# Patient Record
Sex: FEMALE | Race: OTHER
[De-identification: names, ages, dates, MRNs, and addresses within clinical notes are randomized per-mention and may not be internally consistent; named-entity substitution may affect disease eponyms.]

---

## 2019-08-08 ENCOUNTER — HOSPITAL ENCOUNTER (INPATIENT)
Dept: HOSPITAL 74 - JLDR | Age: 39
LOS: 4 days | Discharge: HOME | End: 2019-08-12
Attending: OBSTETRICS & GYNECOLOGY | Admitting: OBSTETRICS & GYNECOLOGY
Payer: COMMERCIAL

## 2019-08-08 VITALS — BODY MASS INDEX: 25.5 KG/M2

## 2019-08-08 DIAGNOSIS — O99.013: ICD-10-CM

## 2019-08-08 DIAGNOSIS — Z3A.38: ICD-10-CM

## 2019-08-08 DIAGNOSIS — D62: ICD-10-CM

## 2019-08-08 LAB
ANION GAP SERPL CALC-SCNC: 12 MMOL/L (ref 8–16)
ANISOCYTOSIS BLD QL: 0
APTT BLD: 31.7 SECONDS (ref 25.2–36.5)
BASOPHILS # BLD: 0.2 % (ref 0–2)
BASOPHILS # BLD: 0.4 % (ref 0–2)
BUN SERPL-MCNC: 11.2 MG/DL (ref 7–18)
CALCIUM SERPL-MCNC: 8.6 MG/DL (ref 8.5–10.1)
CHLORIDE SERPL-SCNC: 110 MMOL/L (ref 98–107)
CO2 SERPL-SCNC: 20 MMOL/L (ref 21–32)
CREAT SERPL-MCNC: 0.7 MG/DL (ref 0.55–1.3)
DEPRECATED RDW RBC AUTO: 12.3 % (ref 11.6–15.6)
DEPRECATED RDW RBC AUTO: 12.4 % (ref 11.6–15.6)
EOSINOPHIL # BLD: 0.1 % (ref 0–4.5)
EOSINOPHIL # BLD: 0.3 % (ref 0–4.5)
GLUCOSE SERPL-MCNC: 68 MG/DL (ref 74–106)
HCT VFR BLD CALC: 31.1 % (ref 32.4–45.2)
HCT VFR BLD CALC: 36.3 % (ref 32.4–45.2)
HGB BLD-MCNC: 10.5 GM/DL (ref 10.7–15.3)
HGB BLD-MCNC: 12.6 GM/DL (ref 10.7–15.3)
INR BLD: 0.91 (ref 0.83–1.09)
LYMPHOCYTES # BLD: 23.4 % (ref 8–40)
LYMPHOCYTES # BLD: 6.7 % (ref 8–40)
MACROCYTES BLD QL: 0
MCH RBC QN AUTO: 33 PG (ref 25.7–33.7)
MCH RBC QN AUTO: 33.8 PG (ref 25.7–33.7)
MCHC RBC AUTO-ENTMCNC: 33.8 G/DL (ref 32–36)
MCHC RBC AUTO-ENTMCNC: 34.9 G/DL (ref 32–36)
MCV RBC: 97 FL (ref 80–96)
MCV RBC: 97.6 FL (ref 80–96)
MONOCYTES # BLD AUTO: 1.6 % (ref 3.8–10.2)
MONOCYTES # BLD AUTO: 4.9 % (ref 3.8–10.2)
NEUTROPHILS # BLD: 71 % (ref 42.8–82.8)
NEUTROPHILS # BLD: 91.4 % (ref 42.8–82.8)
PLATELET # BLD AUTO: 173 K/MM3 (ref 134–434)
PLATELET # BLD AUTO: 233 K/MM3 (ref 134–434)
PLATELET BLD QL SMEAR: NORMAL
PMV BLD: 7.6 FL (ref 7.5–11.1)
PMV BLD: 7.8 FL (ref 7.5–11.1)
POTASSIUM SERPLBLD-SCNC: 4 MMOL/L (ref 3.5–5.1)
PT PNL PPP: 10.7 SEC (ref 9.7–13)
RBC # BLD AUTO: 3.18 M/MM3 (ref 3.6–5.2)
RBC # BLD AUTO: 3.74 M/MM3 (ref 3.6–5.2)
SODIUM SERPL-SCNC: 141 MMOL/L (ref 136–145)
WBC # BLD AUTO: 20 K/MM3 (ref 4–10)
WBC # BLD AUTO: 9.5 K/MM3 (ref 4–10)

## 2019-08-08 RX ADMIN — IBUPROFEN PRN MG: 800 INJECTION INTRAVENOUS at 21:34

## 2019-08-08 NOTE — OP
DATE OF OPERATION:  2019

 

PREOPERATIVE DIAGNOSIS:  Pregnancy with estimated gestational age of 38 weeks 
and 6

days.  Fetus in breech presentation.  Spontaneous labor.  

 

POSTOPERATIVE DIAGNOSIS:  Pregnancy with estimated gestational age of 38 weeks 
and 6

days.  Fetus in breech presentation.  Spontaneous labor.  Delivered.  

 

PROCEDURE:  Primary low transverse  section via Pfannenstiel skin 
incision.  

 

SURGEON:  Dusty Pendleton M.D. 

 

ANESTHESIOLOGIST:  CHIQUI Link  

 

ANESTHESIOLOGIST:  Ruben Burgos M.D. 

 

ANESTHESIA:  Spinal.

 

COMPLICATIONS:  None.

 

ESTIMATED BLOOD LOSS:  1000 mL 

 

INTRAVENOUS FLUIDS:  200 mL 

 

URINE OUTPUT:  300 mL of clear urine at the end of the procedure

 

PATHOLOGY:  Placenta. 

 

FINDINGS:  Live baby boy in saroj breech presentation, no meconium in amniotic

fluids, Apgars 9 and 9, baby's weight is 2977 g, normal uterus, fallopian tubes 
and

ovaries noted.  

 

PROCEDURE:  The patient was met preoperatively.  Risks, benefits, and 
alternatives of

surgery were discussed in detail.  All questions were answered.  The consent 
form was

reviewed and signed.  Patient verbalized her understanding. The patient then

requested to proceed with surgery.  The patient was then brought to the OR with 
the

IV running.  She was placed on the surgical table in a sitting position.  The 
spinal

anesthesia was achieved without difficulty.  The patient was then placed in the

supine position with leftward tilt.  A Petty catheter was inserted and left to 
drain

to gravity.  The patient was prepped and draped in the usual sterile fashion.  A

timeout was conducted as per standard protocol.  The surgeon then proceeded 
with the

operation.  A Pfannenstiel skin incision was made with the knife approximately 
2 cm

above the pubic symphysis.  The incision was taken down to the level of fascia.
  The

fascia was incised in the midline.  The incision was extended bilaterally using 
Smith

scissors.  The fascia was dissected away from the rectus muscles superiorly and

inferiorly.  Sharp and blunt dissection was used to dissect the fascia.  The 
rectus

muscles were  in the midline using blunt dissection.  The peritoneum 
was

identified and entered with 2 Huyen clamps and entered sharply.  The peritoneal

incision was then extended superiorly and inferiorly.  The bladder peritoneum 
was

carefully dissected away from the uterine lower segment using sharp dissection.
  The

bladder was reflected downwards.  The uterus was incised transversely in the 
lower

uterine segment.  The incision was then extended bilaterally using bandage 
scissors. 

The baby was delivered from saroj breech presentation without complications.  
The

baby's nose and mouth were suctioned on perineum.  The baby cried spontaneously 
and

was handed to the waiting neonatologist.  A fragment of the umbilical cord was

secured for umbilical cord blood gas.  The cord blood collection was performed 
for

umbilical cord blood storage as per patient request.  The placenta was then 
delivered

manually and without complications.  The uterus was exteriorized and cleared of 
all

clots and debris.  The uterine incision was then repaired using 0 Biosyn suture 
in a

running, locking stitch.  The uterine incision was then imbricated using a 0 
Biosyn

suture in the running stitch with good hemostasis.  The operative site was 
irrigated

using copious amounts of normal saline.  Once the saline was aspirated, good

hemostasis was noted.  The parietal peritoneum was then closed using a 2-0 
chromic

suture.  The rectus muscles were approximated in the midline using several

interrupted 2-0 chromic sutures.  The fascia was closed using a 0 Vicryl suture 
with

a running stitch.  Good hemostasis and approximation were confirmed.  The

subcutaneous tissues and Bandar's fascia were approximated using several 
interrupted

2-0 chromic sutures.  The skin was closed using a 4-0 Biosyn suture in a 
subcutaneous

stitch.  The patient tolerated procedure well.  She was transferred to recovery 
room

in stable condition.  Sponge, lap, needle counts and instrument counts were 
correct. 

 

 

 

DIDI [id number]

DD: 2019 21:35

DT: 2019 23:34

Job #:  [id number]

MTDD

## 2019-08-08 NOTE — HP
Past Medical History





- Primary Care Physician


PCP:: Dusty Pendleton





- Admission


Chief Complaint: 38yo P0 with pregnancy at EGA 38w6d with fetus in breech 

presentation, in labor, was admitted for primary C/S.


History of Present Illness: 





AMA


Breech


History Source: Patient, Medical Record


Limitations to Obtaining History: No Limitations





- Past Medical History


CNS: No: Alzheimer's, CVA, Dementia, Migraine, Multiple Sclerosis, Peripheral 

Neuropathy, Parkinson's, Seizure, Syncope, TIA, Vertigo, Other


Cardiovascular: No: AFIB, Aneurysm, Aortic Insufficiency, Aortic Stenosis, CAD, 

CHF, Deep Vein Thrombosis, HTN, Hyperlipdemia, MI, Mitral Insufficiency, Mitral 

Stenosis, Murmur, Pulmonary Hypertension, Other


Pulmonary: No: Asthma, Bronchitis, Cancer, COPD, O2 Dependent, Pneumonia, 

Previously Intubated, Pulmonary Embolus, Pulmonary Fibrosis, Sleep Apnea, Other


Gastrointestinal: No: Ascites, Cancer, Constipation, Crohn's Disease, 

Diverticulitis, Diverticulosis, Esophageal Varices, Gastritis, GERD, GI Bleed, 

Hemorrhoids, Hiatal Hernia, Inflamatory Bowel Disease, Irritable Bowel Disease, 

Pancreatitis, Peptic Ulcer Disease, Ulcerative Colitis, Other


Hepatobiliary: No: Cirrhosis, Cholelithiasis, Cholecystitis, Choledocholithiasis

, Hepatitis A, Hepatitis B, Hepatitis C, Other


Renal/: No: Renal Failure, Renal Inusuff, BPH, Cancer, Hematuria, Hemodialysis

, Neurogenic Bladder, Renal Calculi, UTI, Other


Reproductive: No: Ectopic Pregnancy, Endometriosis, Fibroids, PID, Polycystic 

Ovary Syndrome, Postmenopausal, Other


...: 1


...Para: 0


... Weeks Gestation by Dates: 38.6


...EDC by Sono: 19


Heme/Onc: No: Anemia, B12 Deficiency, Bleeding Disorder, Cancer, Current 

Chemotherapy, Current Radiation Therapy, Hemochromatosis, Hypercoaguable State, 

Myeloproliferative Synd, Sickle Cell Disease, Sickle Cell Trait, 

Thrombocytopenia, Other


Infectious Disease: No: AIDS, C-Diff, Herpes Zoster, HIV, MRSA, STD's, 

Tuberculosis, VREF, Other


Psych: No: Addictions, Anxiety, Bipolar, Depression, Panic, Psychosis, 

Schizophrenia, Other


Musculoskeletal: No: Bursitis, Chronic low back pain, Hemiparesis, Hemiplegia, 

Osteoarthritis, Paraplegia, Other


Rheumatology: No: Fibromyalgia, Gout, Lupus, Rheumatoid Arthritis, Sarcoidosis, 

Vasculitis, Other


ENT: No: Allergic Rhinitis, Sinusitis, Other


Endocrine: No: Pako's Disease, Cushing's Disease, Diabetes Insipidus, 

Diabetes Mellitus, Hyperparathyroidism, Hyperthyroidism, Hypothyroidism, 

Osteopenia, SIADH, Other


Dermatology: No: Basal Cell, Cellulitis, Eczema, Melanoma, Psoriasis, Squamous 

Cell, Other





- Past Surgical History


Past Surgical History: Yes: None


Hx Myomectomy: No


Hx Transabdominal Cerclage: No


Additional Surgical History: LEEP





- Smoking History


Smoking history: Never smoked


Have you smoked in the past 12 months: No





- Alcohol/Substance Use


Hx Alcohol Use: No


History of Substance Use: reports: None





- Social History


Usual Living Arrangement: Yes: With Spouse


ADL: Independent


History of Recent Travel: No





Home Medications





- Allergies


Allergies/Adverse Reactions: 


 Allergies











Allergy/AdvReac Type Severity Reaction Status Date / Time


 


Sulfa (Sulfonamide Allergy  Hives Verified 19 15:33





Antibiotics)     














- Home Medications


Home Medications: 


Ambulatory Orders





Prenatal Vits96/Iron Fum/Folic [Prenatal Tablet] 1 each PO DAILY 19 











Family Disease History





- Family Disease History


Family Disease History: Diabetes: Father, Other: Mother (HTN)





Review of Systems





- Review of Systems


Constitutional: reports: Other (contractions, labor)


Eyes: reports: No Symptoms


HENT: reports: No Symptoms


Neck: reports: No Symptoms


Cardiovascular: reports: No Symptoms


Respiratory: reports: No Symptoms


Gastrointestinal: reports: No Symptoms


Genitourinary: reports: No Symptoms


Breasts: reports: No Symptoms Reported


Musculoskeletal: reports: No Symptoms


Integumentary: reports: No Symptoms


Neurological: reports: No Symptoms


Endocrine: reports: No Symptoms


Hematology/Lymphatic: reports: No Symptoms


Psychiatric: reports: No Symptoms


Pain Intensity: 6





Physical Exam - Maternity


Vital Signs: 


 Vital Signs











Temperature  97.8 F   19 21:02


 


Pulse Rate  66   19 21:02


 


Respiratory Rate  20   19 21:02


 


Blood Pressure  110/67   19 21:02


 


O2 Sat by Pulse Oximetry (%)  99   19 20:15











Constitutional: Yes: Well Nourished, No Distress, Calm


Eyes: Yes: WNL, Conjunctiva Clear


HENT: Yes: WNL, Atraumatic, Normocephalic


Neck: Yes: WNL, Supple, Trachea Midline


Cardiovascular: Yes: WNL, Regular Rate and Rhythm


Lungs: Clear to auscultation, Normal air movement





- Abdominal Exam/OB


Fundal Height: 39


Number of Fetuses: Single


Fetal Presentation: Breech


Contractions: Yes


Intensity: Moderate


Fetal Monitor Mode: External


Fetal Heart Rate (range): 135


Fetal Heart Rate Location: Midline


Category: I


Accelerations: Uniform


Decelerations: None





- Vaginal Exam/OB


Vaginal Bleediing: No


Speculum Exam: No


Dilatation (cm): 2


Effacement (%): 90


Amniotic Membrane Status: Intact


Fetal Presentation: Remigio Breech


Fetal Station: -2





- Physical Exam


Musculoskeletal: Yes: WNL


Extremities: Yes: WNL


Edema: No


Edema: LLE: Trace, RLE: Trace


Integumentary: Yes: WNL


Deep Tendon Reflex Grade: Normal +2


...Motor Strength: WNL


Psychiatric: Yes: WNL, Alert, Oriented





- Labs


Lab Results: 


 CBC, BMP





 19 15:45 











Hemorrhage Risk Assessment





- Risk Factors


Medium Risk Factors: Yes: None


High Risk Factors: Yes: None


Risk Score: 1


Risk Level: Medium Risk





Imaging





- Results


Ultrasound: Report Reviewed, Other (breech confirmed by bedside US)





Assessment/Plan





38yo P0 with pregnancy at EGA 38w6d with fetus in breech presentation, in labor

, was admitted for primary C/S. We discussed the risks and benefits of C/S at 

length, including but not limited to scarring, pain, bleeding, infection, 

injury to underlying organs and structures, need for additional surgery to 

repair/treat any problems or complications, fetal complications/injuries, etc. 

The pt verbalized her understanding and requested to proceed with surgery. The 

pt is aware that all surgeries have risks and no guarantees can be provided.
suicidal thoughts

## 2019-08-08 NOTE — OP
Operative Note





- Note:


Operative Date: 19


Pre-Operative Diagnosis: Pregnancy at EGA 38w6d.  Fetus in breech presentation.

  Spont labor


Operation: Primary LT C/S


Findings: 





Live baby boy in saroj breech presentation. No meconium in amniotic fluid. 

APGAR 9-9. Normal uterus, tubes, ovaries. Wt 2,977 grams.


Post-Operative Diagnosis: Same as Pre-op


Surgeon: Dusty Pendleton


Assistant: Parmjit Lepe


Anesthesiologist/CRNA: Ruben Burgos


Anesthesia: Spinal


Specimens Removed: Placenta


Estimated Blood Loss (mls): 1,000


Drains & Tubes with Location: Petty catheter


Drains, Volume Out (mls): 300


Blood Volume Replaced (mls): 0


Fluid Volume Replaced (mls): 2,000


Operative Report Dictated: Yes

## 2019-08-09 LAB
BASOPHILS # BLD: 0.2 % (ref 0–2)
DEPRECATED RDW RBC AUTO: 12.4 % (ref 11.6–15.6)
EOSINOPHIL # BLD: 0 % (ref 0–4.5)
HCT VFR BLD CALC: 24.1 % (ref 32.4–45.2)
HGB BLD-MCNC: 8.4 GM/DL (ref 10.7–15.3)
LYMPHOCYTES # BLD: 12.6 % (ref 8–40)
MCH RBC QN AUTO: 33.8 PG (ref 25.7–33.7)
MCHC RBC AUTO-ENTMCNC: 34.8 G/DL (ref 32–36)
MCV RBC: 97.2 FL (ref 80–96)
MONOCYTES # BLD AUTO: 4.7 % (ref 3.8–10.2)
NEUTROPHILS # BLD: 82.5 % (ref 42.8–82.8)
PLATELET # BLD AUTO: 159 K/MM3 (ref 134–434)
PMV BLD: 7.4 FL (ref 7.5–11.1)
RBC # BLD AUTO: 2.48 M/MM3 (ref 3.6–5.2)
WBC # BLD AUTO: 16.1 K/MM3 (ref 4–10)

## 2019-08-09 RX ADMIN — ENOXAPARIN SODIUM SCH MG: 40 INJECTION SUBCUTANEOUS at 10:45

## 2019-08-09 RX ADMIN — IBUPROFEN PRN MG: 600 TABLET, FILM COATED ORAL at 22:09

## 2019-08-09 RX ADMIN — ACETAMINOPHEN PRN MG: 325 TABLET ORAL at 22:39

## 2019-08-09 RX ADMIN — IBUPROFEN PRN MG: 800 INJECTION INTRAVENOUS at 11:11

## 2019-08-09 RX ADMIN — SIMETHICONE CHEW TAB 80 MG PRN MG: 80 TABLET ORAL at 18:03

## 2019-08-09 RX ADMIN — IBUPROFEN PRN MG: 800 INJECTION INTRAVENOUS at 05:21

## 2019-08-09 RX ADMIN — IBUPROFEN PRN MG: 800 INJECTION INTRAVENOUS at 18:03

## 2019-08-09 RX ADMIN — Medication SCH: at 11:43

## 2019-08-09 RX ADMIN — SIMETHICONE CHEW TAB 80 MG PRN MG: 80 TABLET ORAL at 22:38

## 2019-08-09 RX ADMIN — Medication SCH TAB: at 10:45

## 2019-08-09 NOTE — PN
Progress Note (short form)





- Note


Progress Note: 





Anesthesia postop note





40 y/o F s/p spinal anesthesia/duramorph for  section


POD#1, vss, aaox3, pain well controlled, sensory motor intact distally.


No anesthesia complications.

## 2019-08-10 RX ADMIN — IBUPROFEN PRN MG: 600 TABLET, FILM COATED ORAL at 08:33

## 2019-08-10 RX ADMIN — ACETAMINOPHEN PRN MG: 325 TABLET ORAL at 02:20

## 2019-08-10 RX ADMIN — Medication SCH TAB: at 09:25

## 2019-08-10 RX ADMIN — ENOXAPARIN SODIUM SCH MG: 40 INJECTION SUBCUTANEOUS at 09:26

## 2019-08-10 RX ADMIN — SIMETHICONE CHEW TAB 80 MG PRN MG: 80 TABLET ORAL at 02:18

## 2019-08-10 RX ADMIN — ACETAMINOPHEN PRN MG: 325 TABLET ORAL at 08:32

## 2019-08-10 RX ADMIN — IBUPROFEN PRN MG: 600 TABLET, FILM COATED ORAL at 02:19

## 2019-08-10 RX ADMIN — SIMETHICONE CHEW TAB 80 MG PRN MG: 80 TABLET ORAL at 15:09

## 2019-08-10 RX ADMIN — SIMETHICONE CHEW TAB 80 MG PRN MG: 80 TABLET ORAL at 18:18

## 2019-08-10 RX ADMIN — IBUPROFEN PRN MG: 600 TABLET, FILM COATED ORAL at 18:18

## 2019-08-10 RX ADMIN — ACETAMINOPHEN PRN MG: 325 TABLET ORAL at 18:21

## 2019-08-10 RX ADMIN — SIMETHICONE CHEW TAB 80 MG PRN MG: 80 TABLET ORAL at 08:35

## 2019-08-10 NOTE — PN
Post Partum Progress Note





- Subjective


Subjective: 





Pt w/o new complaints but wants to try a higher dose of Oxycodone for pain.


Reports tolerating oral intake without nausea or vomiting. 


Ambulating without dizziness. 


Denies fevers or chills.


Pain well controlled with oral pain medication.


Pumping/breast feeding without issue.


Passing flatus, no BM. Reports feels constipated


Post Partum Day: 2


Type of Delivery: Primary C/S


Vital Signs: 


 Vital Signs











Temperature  98.5 F   08/09/19 22:00


 


Pulse Rate  69   08/09/19 22:00


 


Respiratory Rate  18   08/09/19 22:00


 


Blood Pressure  108/68   08/09/19 22:00


 


O2 Sat by Pulse Oximetry (%)  99   08/08/19 20:15











Breast Exam: Yes: Soft


Uterus: Yes: Fundus Firm


Incision: Yes: Sutures intact


Abdomen/GI: Yes: Abdomen soft


Lochia: Yes: Rubra


Lochia, amount: Small


Extremities: Yes: Calves non-tender


Perineum: Yes: Intact


Activity: Ambulating





- Labs


Labs: 


 CBC











WBC  16.1 K/mm3 (4.0-10.0)  H  08/09/19  06:30    


 


RBC  2.48 M/mm3 (3.60-5.2)  L  08/09/19  06:30    


 


Hgb  8.4 GM/dL (10.7-15.3)  L  08/09/19  06:30    


 


Hct  24.1 % (32.4-45.2)  L D 08/09/19  06:30    


 


MCV  97.2 fl (80-96)  H  08/09/19  06:30    


 


MCH  33.8 pg (25.7-33.7)  H  08/09/19  06:30    


 


MCHC  34.8 g/dl (32.0-36.0)   08/09/19  06:30    


 


RDW  12.4 % (11.6-15.6)   08/09/19  06:30    


 


Plt Count  159 K/MM3 (134-434)   08/09/19  06:30    


 


MPV  7.4 fl (7.5-11.1)  L  08/09/19  06:30    


 


Absolute Neuts (auto)  13.3 K/mm3 (1.5-8.0)  H  08/09/19  06:30    


 


Neutrophils %  82.5 % (42.8-82.8)   08/09/19  06:30    


 


Neutrophils % (Manual)  93.0 % (42.8-82.8)  H  08/08/19  20:30    


 


Band Neutrophils %  0.0 %  08/08/19  20:30    


 


Lymphocytes %  12.6 % (8-40)  D 08/09/19  06:30    


 


Lymphocytes % (Manual)  6.0 % (8-40)  L  08/08/19  20:30    


 


Monocytes %  4.7 % (3.8-10.2)  D 08/09/19  06:30    


 


Monocytes % (Manual)  1 % (3.8-10.2)  L  08/08/19  20:30    


 


Eosinophils %  0.0 % (0-4.5)  D 08/09/19  06:30    


 


Eosinophils % (Manual)  0.0 % (0-4.5)   08/08/19  20:30    


 


Basophils %  0.2 % (0-2.0)   08/09/19  06:30    


 


Basophils % (Manual)  0.0 % (0-2.0)   08/08/19  20:30    


 


Myelocytes % (Man)  0 % (0-2)   08/08/19  20:30    


 


Promyelocytes % (Man)  0 % (0-2)   08/08/19  20:30    


 


Blast Cells % (Manual)  0 % (0-0)   08/08/19  20:30    


 


Nucleated RBC %  0 % (0-0)   08/09/19  06:30    


 


Metamyelocytes  0 % (0-2)   08/08/19  20:30    


 


Hypochromia  0   08/08/19  20:30    


 


Platelet Estimate  Normal   08/08/19  20:30    


 


Polychromasia  0   08/08/19  20:30    


 


Poikilocytosis  0   08/08/19  20:30    


 


Anisocytosis  0   08/08/19  20:30    


 


Microcytosis  0   08/08/19  20:30    


 


Macrocytosis  0   08/08/19  20:30    














Assessment/Plan





38yo P1 s/p primary LT C/S for breech.


Pt is doing well an recovering normally.


Asymptomatic for anemia.


Continue routine postop care.


Ambulation encouraged.

## 2019-08-11 VITALS — HEART RATE: 70 BPM

## 2019-08-11 LAB
BASOPHILS # BLD: 0.3 % (ref 0–2)
DEPRECATED RDW RBC AUTO: 13 % (ref 11.6–15.6)
EOSINOPHIL # BLD: 0.6 % (ref 0–4.5)
HCT VFR BLD CALC: 24.9 % (ref 32.4–45.2)
HGB BLD-MCNC: 8.7 GM/DL (ref 10.7–15.3)
LYMPHOCYTES # BLD: 12.3 % (ref 8–40)
MCH RBC QN AUTO: 34.2 PG (ref 25.7–33.7)
MCHC RBC AUTO-ENTMCNC: 35 G/DL (ref 32–36)
MCV RBC: 97.7 FL (ref 80–96)
MONOCYTES # BLD AUTO: 5.2 % (ref 3.8–10.2)
NEUTROPHILS # BLD: 81.6 % (ref 42.8–82.8)
PLATELET # BLD AUTO: 253 K/MM3 (ref 134–434)
PMV BLD: 6.8 FL (ref 7.5–11.1)
RBC # BLD AUTO: 2.54 M/MM3 (ref 3.6–5.2)
WBC # BLD AUTO: 10.1 K/MM3 (ref 4–10)

## 2019-08-11 RX ADMIN — SIMETHICONE CHEW TAB 80 MG PRN MG: 80 TABLET ORAL at 08:46

## 2019-08-11 RX ADMIN — IBUPROFEN PRN MG: 600 TABLET, FILM COATED ORAL at 04:22

## 2019-08-11 RX ADMIN — SIMETHICONE CHEW TAB 80 MG PRN MG: 80 TABLET ORAL at 04:19

## 2019-08-11 RX ADMIN — ACETAMINOPHEN PRN MG: 325 TABLET ORAL at 20:49

## 2019-08-11 RX ADMIN — SIMETHICONE CHEW TAB 80 MG PRN MG: 80 TABLET ORAL at 12:43

## 2019-08-11 RX ADMIN — ACETAMINOPHEN PRN MG: 325 TABLET ORAL at 04:19

## 2019-08-11 RX ADMIN — SIMETHICONE CHEW TAB 80 MG PRN MG: 80 TABLET ORAL at 20:49

## 2019-08-11 RX ADMIN — ENOXAPARIN SODIUM SCH MG: 40 INJECTION SUBCUTANEOUS at 09:16

## 2019-08-11 RX ADMIN — IBUPROFEN PRN MG: 600 TABLET, FILM COATED ORAL at 20:50

## 2019-08-11 RX ADMIN — ACETAMINOPHEN PRN MG: 325 TABLET ORAL at 08:44

## 2019-08-11 RX ADMIN — IBUPROFEN PRN MG: 600 TABLET, FILM COATED ORAL at 08:43

## 2019-08-11 RX ADMIN — IBUPROFEN PRN MG: 600 TABLET, FILM COATED ORAL at 12:42

## 2019-08-11 RX ADMIN — Medication SCH TAB: at 09:16

## 2019-08-11 NOTE — PN
Post Partum Progress Note





- Subjective


Subjective: 





Patient without acute complaints. 


Reports tolerating oral intake without nausea or vomiting. 


Ambulating without dizziness. 


Denies fevers or chills.


Pain well controlled with oral pain medication.


Pumping/breast feeding without issue.


Passing flatus. Had BM yesterday.


Post Partum Day: 3


Type of Delivery: Primary C/S


Vital Signs: 


 Vital Signs











Temperature  99 F   08/11/19 09:54


 


Pulse Rate  74   08/11/19 09:54


 


Respiratory Rate  20   08/11/19 09:54


 


Blood Pressure  112/50 L  08/11/19 09:54


 


O2 Sat by Pulse Oximetry (%)  99   08/08/19 20:15











Breast Exam: Yes: Soft


Uterus: Yes: Fundus Firm, Fundus below umbilicus, Non-tender


Incision: Yes: Sutures intact


Abdomen/GI: Yes: Abdomen soft, Passing flatus, Tolerating PO


Lochia: Yes: Rubra


Lochia, amount: Small


Extremities: Yes: Calves non-tender, Edema (trace pedal)


Perineum: Yes: Intact


Activity: Ambulating





- Labs


Labs: 


 CBC











WBC  10.1 K/mm3 (4.0-10.0)  H  08/11/19  08:10    


 


RBC  2.54 M/mm3 (3.60-5.2)  L  08/11/19  08:10    


 


Hgb  8.7 GM/dL (10.7-15.3)  L  08/11/19  08:10    


 


Hct  24.9 % (32.4-45.2)  L  08/11/19  08:10    


 


MCV  97.7 fl (80-96)  H  08/11/19  08:10    


 


MCH  34.2 pg (25.7-33.7)  H  08/11/19  08:10    


 


MCHC  35.0 g/dl (32.0-36.0)   08/11/19  08:10    


 


RDW  13.0 % (11.6-15.6)   08/11/19  08:10    


 


Plt Count  253 K/MM3 (134-434)  D 08/11/19  08:10    


 


MPV  6.8 fl (7.5-11.1)  L  08/11/19  08:10    


 


Absolute Neuts (auto)  8.2 K/mm3 (1.5-8.0)  H  08/11/19  08:10    


 


Neutrophils %  81.6 % (42.8-82.8)   08/11/19  08:10    


 


Neutrophils % (Manual)  93.0 % (42.8-82.8)  H  08/08/19  20:30    


 


Band Neutrophils %  0.0 %  08/08/19  20:30    


 


Lymphocytes %  12.3 % (8-40)   08/11/19  08:10    


 


Lymphocytes % (Manual)  6.0 % (8-40)  L  08/08/19  20:30    


 


Monocytes %  5.2 % (3.8-10.2)   08/11/19  08:10    


 


Monocytes % (Manual)  1 % (3.8-10.2)  L  08/08/19  20:30    


 


Eosinophils %  0.6 % (0-4.5)  D 08/11/19  08:10    


 


Eosinophils % (Manual)  0.0 % (0-4.5)   08/08/19  20:30    


 


Basophils %  0.3 % (0-2.0)   08/11/19  08:10    


 


Basophils % (Manual)  0.0 % (0-2.0)   08/08/19  20:30    


 


Myelocytes % (Man)  0 % (0-2)   08/08/19  20:30    


 


Promyelocytes % (Man)  0 % (0-2)   08/08/19  20:30    


 


Blast Cells % (Manual)  0 % (0-0)   08/08/19  20:30    


 


Nucleated RBC %  0 % (0-0)   08/11/19  08:10    


 


Metamyelocytes  0 % (0-2)   08/08/19  20:30    


 


Hypochromia  0   08/08/19  20:30    


 


Platelet Estimate  Normal   08/08/19  20:30    


 


Polychromasia  0   08/08/19  20:30    


 


Poikilocytosis  0   08/08/19  20:30    


 


Anisocytosis  0   08/08/19  20:30    


 


Microcytosis  0   08/08/19  20:30    


 


Macrocytosis  0   08/08/19  20:30    














Assessment/Plan





40yo P1 s/p primary LT C/S for breech.


Pt is doing well an recovering normally.


Asymptomatic for anemia. Hct is stable


Continue routine postop care.


Ambulation encouraged.


Postop care and discharge instructions were d/w pt and her .

## 2019-08-11 NOTE — DS
Physical Exam-GYN


Vital Signs: 


 Vital Signs











Temperature  99 F   19 09:54


 


Pulse Rate  74   19 09:54


 


Respiratory Rate  20   19 09:54


 


Blood Pressure  112/50 L  19 09:54


 


O2 Sat by Pulse Oximetry (%)  99   19 20:15











Constitutional: Yes: Well Nourished, No Distress, Calm


Eyes: Yes: WNL, Conjunctiva Clear


HENT: Yes: WNL, Atraumatic, Normocephalic


Neck: Yes: WNL, Supple, Trachea Midline


Cardiovascular: Yes: WNL, Regular Rate and Rhythm


Respiratory: Yes: WNL, Regular, CTA Bilaterally


Gastrointestinal: Yes: WNL, Normal Bowel Sounds, Soft


...Rectal Exam: Yes: Deferred


Renal/: Yes: WNL


....Post Partum: Yes: Uterus firm, Uterus non-tender, Slight lochia rubra


Breast(s): Yes: WNL


Musculoskeletal: Yes: WNL


Extremities: Yes: WNL


Edema: Yes


Edema: LLE: Trace, RLE: Trace


Integumentary: Yes: WNL


Wound/Incision: Yes: Clean/Dry, Well Approximated, Sutures Intact, Steri Strips

, Open to air


Neurological: Yes: WNL, Alert, Oriented


...Motor Strength: WNL


Psychiatric: Yes: WNL, Alert, Oriented


Labs: 


 CBC, BMP





 19 08:10 





 19 15:45 











Delivery





- Delivery


 Section: Primary, Low Flap Transverse


Type of Anesthesia: Spinal


Episiotomy/Laceration: None


EBL (cc): 1,000





Delivery, Single Birth





- Stages of Labor


Date 1st Stage Initiatied: 19


Time 1st Stage Initiated: 09:00


Date of Delivery: 19


Time of Delivery: 18:24


Date Placenta Delivered: 19


Time Placenta Delivered: 18:29


Placenta: Yes: Expressed, Manual Removal, Normal Configuration





- Condition of Infant


Pediatrician/Neonatologist Present: Yes


Name: Nikko Mane


Infant Gender: Male


Birth Weight: 2.977 kg


Total Hours ROM (Hrs/Mins): 70 minutes





- Apgar


  ** 1 Minute


Apgar Total Score: 9





  ** 5 Minutes


Apgar Total Score: 9





-  Feeding Plan


Initial Plan: Elected not to breastfeed exclusively throughout hospitalization


Benefits of Breastfeeding Exclusively reinforced: Yes





Discharge Summary


Reason For Visit: 





Breech/malpresentation


Spontaneous labor


Anemia due to acute blood loss


Procedures: Principal: Primary LT C/S


Hospital Course: 





Normal recovery


Condition: Good





- Instructions


Diet, Activity, Other Instructions: 





Physical activity





Resume your normal everyday activity as tolerated no heavy lifting or exercise 

until seen by your surgeon. You may walk unlimited niecy of and climb stairs. 

You may resume driving the car when you feel safe and comfortable behind the 

wheel. No sexual activity as instructed.





Wound care


If you have a bandage, leave it on, and keep dry for 48-72 hours. After that 

time discard the outer bandage. If they are tapes on the skin under the out of 

bandage leave them in place. They will peel off in the next 7 to 10 days. Do 

Not Peel them off. You may shower the day after surgery. If there are tapes 

present on the skin, you may shower over them.





Diet


There are no dietary restrictions. Eat healthy, high-fiber foods. Drink 6 to 8 

glasses of liquid each day. This will assist in keeping your bowels are regular.





Pain management


You may take Tylenol or acetaminophen or Ibuprofen (for example, Motrin, Advil 

etc.) from my pain prescription medication is ordered should be taken as 

prescribed for moderate to severe pain.





Call MD for any of the following:





Severe pain not relieved by medication


Fever of 101 or higher


Excessive bleeding or drainage on dressing


Inability to urinate

















Referrals: 


Dusty Pendleton MD [Staff Physician] - 


Disposition: HOME





- Home Medications


Comprehensive Discharge Medication List: 


Ambulatory Orders





Prenatal Vits96/Iron Fum/Folic [Prenatal Tablet] 1 each PO DAILY 19

## 2019-08-12 VITALS — DIASTOLIC BLOOD PRESSURE: 71 MMHG | SYSTOLIC BLOOD PRESSURE: 110 MMHG | TEMPERATURE: 98.6 F

## 2019-08-12 RX ADMIN — Medication SCH TAB: at 10:08

## 2019-08-12 RX ADMIN — ENOXAPARIN SODIUM SCH MG: 40 INJECTION SUBCUTANEOUS at 10:08

## 2019-08-12 RX ADMIN — IBUPROFEN PRN MG: 600 TABLET, FILM COATED ORAL at 08:18

## 2019-08-12 RX ADMIN — SIMETHICONE CHEW TAB 80 MG PRN MG: 80 TABLET ORAL at 08:25

## 2019-08-12 RX ADMIN — ACETAMINOPHEN PRN MG: 325 TABLET ORAL at 08:18

## 2019-08-12 NOTE — PN
Post Partum Progress Note





- Subjective


Subjective: 





Pt w/o complaints.


Post Partum Day: 4


Type of Delivery: Primary C/S


Vital Signs: 


 Vital Signs











Temperature  97.8 F   08/11/19 22:00


 


Pulse Rate  70   08/11/19 22:00


 


Respiratory Rate  18   08/11/19 22:00


 


Blood Pressure  116/69   08/11/19 22:00


 


O2 Sat by Pulse Oximetry (%)  99   08/08/19 20:15











Breast Exam: Yes: Soft


Uterus: Yes: Fundus Firm, Fundus below umbilicus, Non-tender


Incision: Yes: Sutures intact


Abdomen/GI: Yes: Abdomen soft, Tolerating PO


Lochia: Yes: Rubra


Lochia, amount: Small


Extremities: Yes: Calves non-tender


Perineum: Yes: Intact


Activity: Ambulating





- Labs


Labs: 


 CBC











WBC  10.1 K/mm3 (4.0-10.0)  H  08/11/19  08:10    


 


RBC  2.54 M/mm3 (3.60-5.2)  L  08/11/19  08:10    


 


Hgb  8.7 GM/dL (10.7-15.3)  L  08/11/19  08:10    


 


Hct  24.9 % (32.4-45.2)  L  08/11/19  08:10    


 


MCV  97.7 fl (80-96)  H  08/11/19  08:10    


 


MCH  34.2 pg (25.7-33.7)  H  08/11/19  08:10    


 


MCHC  35.0 g/dl (32.0-36.0)   08/11/19  08:10    


 


RDW  13.0 % (11.6-15.6)   08/11/19  08:10    


 


Plt Count  253 K/MM3 (134-434)  D 08/11/19  08:10    


 


MPV  6.8 fl (7.5-11.1)  L  08/11/19  08:10    


 


Absolute Neuts (auto)  8.2 K/mm3 (1.5-8.0)  H  08/11/19  08:10    


 


Neutrophils %  81.6 % (42.8-82.8)   08/11/19  08:10    


 


Neutrophils % (Manual)  93.0 % (42.8-82.8)  H  08/08/19  20:30    


 


Band Neutrophils %  0.0 %  08/08/19  20:30    


 


Lymphocytes %  12.3 % (8-40)   08/11/19  08:10    


 


Lymphocytes % (Manual)  6.0 % (8-40)  L  08/08/19  20:30    


 


Monocytes %  5.2 % (3.8-10.2)   08/11/19  08:10    


 


Monocytes % (Manual)  1 % (3.8-10.2)  L  08/08/19  20:30    


 


Eosinophils %  0.6 % (0-4.5)  D 08/11/19  08:10    


 


Eosinophils % (Manual)  0.0 % (0-4.5)   08/08/19  20:30    


 


Basophils %  0.3 % (0-2.0)   08/11/19  08:10    


 


Basophils % (Manual)  0.0 % (0-2.0)   08/08/19  20:30    


 


Myelocytes % (Man)  0 % (0-2)   08/08/19  20:30    


 


Promyelocytes % (Man)  0 % (0-2)   08/08/19  20:30    


 


Blast Cells % (Manual)  0 % (0-0)   08/08/19  20:30    


 


Nucleated RBC %  0 % (0-0)   08/11/19  08:10    


 


Metamyelocytes  0 % (0-2)   08/08/19  20:30    


 


Hypochromia  0   08/08/19  20:30    


 


Platelet Estimate  Normal   08/08/19  20:30    


 


Polychromasia  0   08/08/19  20:30    


 


Poikilocytosis  0   08/08/19  20:30    


 


Anisocytosis  0   08/08/19  20:30    


 


Microcytosis  0   08/08/19  20:30    


 


Macrocytosis  0   08/08/19  20:30    














Assessment/Plan





38yo P1 s/p primary LT C/S for breech.


Pt is doing well an recovering normally.


Asymptomatic for anemia.


Continue routine postop care.


Ambulation encouraged.


Postop care and discharge instructions were d/w pt and her .


F/u in office next week.

## 2019-08-13 NOTE — PATH
Surgical Pathology Report



Patient Name:  PAM RODRIGUEZ

Accession #:  [id number]

Med. Rec. #:  [id number]                                                        

   /Age/Gender:  1980 (Age: 39) / F

Account:  [id number]                                                          

             Location: [facility] OBS/GYN

Taken:  2019

Received:  2019

Reported:  2019

Physicians:  Dusty Pendleton M.D.

  



Specimen(s) Received

 PLACENTA 





Clinical History

, 38.6 weeks gestation, breech presentation in labor







Final Diagnosis

PLACENTA,  SECTION:

373 G THIRD TRIMESTER PLACENTA WITH TRIVASCULAR UMBILICAL CORD AND UNREMARKABLE

PLACENTAL MEMBRANES.





***Electronically Signed***

Pam Adame M.D.





Gross Description

The specimen is received fresh labeled placenta and is a 373 gram, 17.5 x 16.0 x

2.0 cm. placenta with attached membranes and umbilical cord.  The attached

membranes are tan, translucent with focal opacities and insert marginally.  The

umbilical cord measures 17 cm. in length and averages 0.9 cm. in diameter.  The

cord inserts eccentrically, 6 cm. to the nearest margin.  No true knots or

strictures are identified. Cut surface of the umbilical cord reveals 3 vessels.

The fetal surface is grey-blue with minimal fibrin deposition and appropriate

caliber vessels.  The maternal surface is red-brown with focal defects. 

Sectioning reveals red-brown, spongy parenchyma.  No lesions are identified. 

Representative sections are submitted in three cassettes as follows: 1- membrane

rolls and umbilical cord; 2-3- full thickness sections of placenta.





saudi